# Patient Record
Sex: FEMALE | Race: WHITE
[De-identification: names, ages, dates, MRNs, and addresses within clinical notes are randomized per-mention and may not be internally consistent; named-entity substitution may affect disease eponyms.]

---

## 2017-12-30 ENCOUNTER — HOSPITAL ENCOUNTER (INPATIENT)
Dept: HOSPITAL 92 - ERS | Age: 82
LOS: 7 days | Discharge: NURSING, REHAB OR LONG-TERM CARE | DRG: 469 | End: 2018-01-06
Attending: SURGERY | Admitting: SURGERY
Payer: MEDICARE

## 2017-12-30 ENCOUNTER — HOSPITAL ENCOUNTER (EMERGENCY)
Dept: HOSPITAL 9 - MADERS | Age: 82
Discharge: ANOTHER HOSPITAL | End: 2017-12-30
Payer: MEDICARE

## 2017-12-30 VITALS — BODY MASS INDEX: 27.9 KG/M2

## 2017-12-30 DIAGNOSIS — S72.012A: Primary | ICD-10-CM

## 2017-12-30 DIAGNOSIS — J06.9: ICD-10-CM

## 2017-12-30 DIAGNOSIS — F03.90: ICD-10-CM
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
## 2017-12-30 DIAGNOSIS — J98.11: ICD-10-CM

## 2017-12-30 DIAGNOSIS — G47.31: ICD-10-CM
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
## 2017-12-30 DIAGNOSIS — D62: ICD-10-CM

## 2017-12-30 DIAGNOSIS — Z79.84: ICD-10-CM

## 2017-12-30 DIAGNOSIS — Z90.11: ICD-10-CM

## 2017-12-30 DIAGNOSIS — R41.0: ICD-10-CM

## 2017-12-30 DIAGNOSIS — Z79.899: ICD-10-CM

## 2017-12-30 DIAGNOSIS — E87.6: ICD-10-CM

## 2017-12-30 DIAGNOSIS — E83.39: ICD-10-CM

## 2017-12-30 DIAGNOSIS — E83.42: ICD-10-CM

## 2017-12-30 DIAGNOSIS — E11.9: ICD-10-CM

## 2017-12-30 DIAGNOSIS — J96.01: ICD-10-CM
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
## 2017-12-30 DIAGNOSIS — B97.4: ICD-10-CM

## 2017-12-30 DIAGNOSIS — I10: ICD-10-CM

## 2017-12-30 DIAGNOSIS — S72.002A: Primary | ICD-10-CM
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
## 2017-12-30 LAB
ALBUMIN SERPL BCG-MCNC: 3.6 G/DL (ref 3.4–4.8)
ALP SERPL-CCNC: 122 U/L (ref 40–150)
ALT SERPL W P-5'-P-CCNC: 14 U/L (ref 8–55)
ANION GAP SERPL CALC-SCNC: 16 MMOL/L (ref 10–20)
APTT PPP: 30.5 SEC (ref 22.9–36.1)
AST SERPL-CCNC: 16 U/L (ref 5–34)
BASOPHILS # BLD AUTO: 0 THOU/UL (ref 0–0.2)
BASOPHILS NFR BLD AUTO: 0.3 % (ref 0–1)
BILIRUB SERPL-MCNC: 0.4 MG/DL (ref 0.2–1.2)
BUN SERPL-MCNC: 23 MG/DL (ref 9.8–20.1)
CALCIUM SERPL-MCNC: 9.5 MG/DL (ref 7.8–10.44)
CHLORIDE SERPL-SCNC: 109 MMOL/L (ref 98–107)
CK MB SERPL-MCNC: 1.2 NG/ML (ref 0–6.6)
CK SERPL-CCNC: 45 U/L (ref 29–168)
CO2 SERPL-SCNC: 20 MMOL/L (ref 23–31)
CREAT CL PREDICTED SERPL C-G-VRATE: 0 ML/MIN (ref 70–130)
EOSINOPHIL # BLD AUTO: 0 THOU/UL (ref 0–0.7)
EOSINOPHIL NFR BLD AUTO: 0.6 % (ref 0–10)
GLOBULIN SER CALC-MCNC: 2.9 G/DL (ref 2.4–3.5)
GLUCOSE SERPL-MCNC: 147 MG/DL (ref 83–110)
HGB BLD-MCNC: 11.7 G/DL (ref 12–16)
INR PPP: 1
LYMPHOCYTES # BLD AUTO: 0.7 THOU/UL (ref 1.2–3.4)
LYMPHOCYTES NFR BLD AUTO: 10.9 % (ref 21–51)
MCH RBC QN AUTO: 29.5 PG (ref 27–31)
MCV RBC AUTO: 86.3 FL (ref 81–99)
MONOCYTES # BLD AUTO: 0.3 THOU/UL (ref 0.11–0.59)
MONOCYTES NFR BLD AUTO: 5 % (ref 0–10)
NEUTROPHILS # BLD AUTO: 5.1 THOU/UL (ref 1.4–6.5)
NEUTROPHILS NFR BLD AUTO: 83.2 % (ref 42–75)
PLATELET # BLD AUTO: 136 THOU/UL (ref 130–400)
POTASSIUM SERPL-SCNC: 3.8 MMOL/L (ref 3.5–5.1)
PROTHROMBIN TIME: 13.3 SEC (ref 12–14.7)
RBC # BLD AUTO: 3.97 MILL/UL (ref 4.2–5.4)
SODIUM SERPL-SCNC: 141 MMOL/L (ref 136–145)
TROPONIN I SERPL DL<=0.01 NG/ML-MCNC: (no result) NG/ML (ref ?–0.03)
WBC # BLD AUTO: 6.1 THOU/UL (ref 4.8–10.8)

## 2017-12-30 PROCEDURE — 85730 THROMBOPLASTIN TIME PARTIAL: CPT

## 2017-12-30 PROCEDURE — 84100 ASSAY OF PHOSPHORUS: CPT

## 2017-12-30 PROCEDURE — 87633 RESP VIRUS 12-25 TARGETS: CPT

## 2017-12-30 PROCEDURE — 85610 PROTHROMBIN TIME: CPT

## 2017-12-30 PROCEDURE — 96374 THER/PROPH/DIAG INJ IV PUSH: CPT

## 2017-12-30 PROCEDURE — 94760 N-INVAS EAR/PLS OXIMETRY 1: CPT

## 2017-12-30 PROCEDURE — 85025 COMPLETE CBC W/AUTO DIFF WBC: CPT

## 2017-12-30 PROCEDURE — 85027 COMPLETE CBC AUTOMATED: CPT

## 2017-12-30 PROCEDURE — 84484 ASSAY OF TROPONIN QUANT: CPT

## 2017-12-30 PROCEDURE — 0SRS01A REPLACEMENT OF LEFT HIP JOINT, FEMORAL SURFACE WITH METAL SYNTHETIC SUBSTITUTE, UNCEMENTED, OPEN APPROACH: ICD-10-PCS | Performed by: ORTHOPAEDIC SURGERY

## 2017-12-30 PROCEDURE — 93010 ELECTROCARDIOGRAM REPORT: CPT

## 2017-12-30 PROCEDURE — 71045 X-RAY EXAM CHEST 1 VIEW: CPT

## 2017-12-30 PROCEDURE — 83735 ASSAY OF MAGNESIUM: CPT

## 2017-12-30 PROCEDURE — 71010: CPT

## 2017-12-30 PROCEDURE — 80048 BASIC METABOLIC PNL TOTAL CA: CPT

## 2017-12-30 PROCEDURE — 36416 COLLJ CAPILLARY BLOOD SPEC: CPT

## 2017-12-30 PROCEDURE — 93005 ELECTROCARDIOGRAM TRACING: CPT

## 2017-12-30 PROCEDURE — 83880 ASSAY OF NATRIURETIC PEPTIDE: CPT

## 2017-12-30 PROCEDURE — 82553 CREATINE MB FRACTION: CPT

## 2017-12-30 PROCEDURE — 94640 AIRWAY INHALATION TREATMENT: CPT

## 2017-12-30 PROCEDURE — S0028 INJECTION, FAMOTIDINE, 20 MG: HCPCS

## 2017-12-30 PROCEDURE — 80053 COMPREHEN METABOLIC PANEL: CPT

## 2017-12-30 PROCEDURE — 72170 X-RAY EXAM OF PELVIS: CPT

## 2017-12-30 PROCEDURE — 36415 COLL VENOUS BLD VENIPUNCTURE: CPT

## 2017-12-30 PROCEDURE — G0390 TRAUMA RESPONS W/HOSP CRITI: HCPCS

## 2017-12-30 PROCEDURE — 82550 ASSAY OF CK (CPK): CPT

## 2017-12-30 RX ADMIN — Medication SCH GM: at 21:18

## 2017-12-30 RX ADMIN — DOCUSATE SODIUM 50 MG AND SENNOSIDES 8.6 MG SCH TAB: 8.6; 5 TABLET, FILM COATED ORAL at 21:12

## 2017-12-30 NOTE — RAD
PORTABLE CHEST ONE VIEW:

12/30/2017 at 8:31 a.m.

 

HISTORY:

Pre-op evaluation.

 

FINDINGS/IMPRESSION:

The heart size is enlarged.  Bibasilar infiltrates versus atelectasis are seen.  No pneumothoraces ar
e seen.  Small effusions cannot be excluded.  There is no evidence of rufino pleural edema.

 

 

POS: [facility]

## 2017-12-30 NOTE — RAD
TWO VIEWS OF THE LEFT HIP:

 

INDICATION:

Post-op left hip.

 

COMPARISON: 

Two views of the left hip dated 12/30/2017.

 

FINDINGS:   

Since the comparison examination, there has been interval placement of a left hip endoprosthesis.  Th
e prosthetic component projects in the expected position.

 

IMPRESSION:   

Postoperative left hip.

 

POS: [facility]

## 2017-12-30 NOTE — CON
DATE OF SERVICE:  12/30/2017

 

TRAUMA CONSULT

 

Please see Lizbet Williamson NP complete history of present illness.

 

BRIEF HISTORY:  This is an 82-year-old female who sustained a left hip fracture and a fall and transf
erred to [facility] for higher level of care.  Hemodynamically stable.  No other complaints.  She is 
confused, which makes the history difficulty.  She is unsure of her home medications or chronic medic
al history.

 

PHYSICAL EXAMINATION:

GENERAL:  She is awake and alert, although confused.

CHEST:  Bilateral clear.

HEART:  Regular rate and rhythm.

BREAST:  She has obvious right mastectomy in the past.

ABDOMEN:  Soft, tenderness in the left hip.

EXTREMITIES:  Left lower extremity shortened, externally rotated, but palpable radial and palpable do
rsalis pedis pulses.  No obvious limb threatening ischemia.

 

ASSESSMENT:  Left hip fracture.

 

PLAN:  Plan per ortho, trauma to admit.  See Lizbet Williamson NP note.

## 2017-12-30 NOTE — CON
DATE OF CONSULTATION:  12/30/2017

 

REASON FOR CONSULTATION:  Left hip pain.

 

HISTORY OF PRESENT ILLNESS:  This is a very pleasant woman who is 82 years of age, she got out of bed
 today and fell onto her left hip.

 

PAST MEDICAL HISTORY:  Positive for hypertension, high cholesterol, dementia.

 

SOCIAL HISTORY:  Does not smoke.  She does not drink.  She has excellent social support.  She is a ho
usehold only ambulator with the assistance of a walker.

 

MEDICATIONS:  Listed in the chart.

 

PHYSICAL EXAMINATION:

GENERAL:  Shows a pleasant woman who is in no distress.  She is actually able to answer some question
s, but seems to be fairly confused.

HEENT:  Normocephalic, atraumatic.

LUNGS:  Clear.

HEART:  Regular rate and rhythm.

ABDOMEN:  Soft, nontender.

EXTREMITIES:  Left hip shows shortening external rotation, tender to manipulation, she has intact DP 
and PT pulses.  No skin lesions.

 

IMAGING DATA:  Radiographs show a displaced femoral neck fracture.

 

PLAN:  Hemiarthroplasty of hip.  She and her family understand risk of infection, stiffness, nerve or
 blood vessel damage, blood clots, transfusion, death, dislocation and would like to proceed with ted ontiveros.

## 2017-12-30 NOTE — RAD
LEFT HIP TWO VIEWS:

 

HISTORY:

Left hip pain.

 

FINDINGS:

There is a fracture involving the subcapital region of the neck of the left femur with associated for
eshortening.

 

POS: [facility]

## 2017-12-30 NOTE — HP
DATE OF ADMISSION:  12/30/2017

 

ADMITTING PHYSICIAN:  Dr. Roger Purvis.

 

CONSULTING PHYSICIAN:  Dr. Vik Goodman, Orthopedic.

 

HISTORY OF PRESENT ILLNESS:  An 82-year-old female, who was in her usual state 
of health this morning when she apparently had a fall getting out of bed.  She 
lives at home with her daughter and daughter reports that she heard her mother 
fall and then went to the bedroom and found her on the floor.  She was 
complaining of left hip pain.  She was transported by ambulance to [facility], where left hip fracture was identified.  She was then transferred 
to [place] for higher level of care.  She is now seen in the ER, by Trauma 
Services.  She complains of pain to the left hip.  She has been hemodynamically 
stable.  Neurologically, she has remained at baseline.  Pain is worse with 
movement of left leg.  The patient's pain is relieved by nothing.  She is 
unable to quantify the pain or describe the quality of the pain.

 

REVIEW OF SYSTEMS:  The patient is unable to participate with review of systems 
due to baseline dementia.

 

PAST MEDICAL HISTORY:  Diabetes type 2, hypertension, dementia.

 

PAST SURGICAL HISTORY:  Right mastectomy, hysterectomy, cholecystectomy, 
unknown years.

 

SOCIAL HISTORY:  Family denies intake of alcohol, drugs or tobacco.  Patient 
lives at home with her daughter.

 

FAMILY HISTORY:  Unknown.

 

ALLERGIES:  No known drug allergies.

 

CURRENT MEDICATIONS:

1.  Losartan 25 mg once daily.

2.  Metformin 500 mg once daily.

3.  Citalopram 20 mg once daily.

4.  Hydrochlorothiazide 12.5 mg once daily.

5.  Temazepam 15 mg once daily.

 

PHYSICAL EXAMINATION:

VITAL SIGNS:  Blood pressure 152/72, pulse 73, respirations 22, temperature 98.3
, and O2 sat 98%.

CONSTITUTIONAL:  Elderly female in no acute distress.  Lying on bed.  Able to 
answer questions, but remains confused.

HEENT:  Atraumatic, normocephalic.

RESPIRATORY:  Respirations even and unlabored.  Expiratory wheezes heard in 
bilateral upper lobes.

CARDIOVASCULAR:  Regular rate and rhythm.  Heart sounds normal.

ABDOMEN:  Soft, nontender, nondistended.  Bowel sounds present.

EXTREMITIES:  With the exception of the left lower extremities are free from 
visible trauma.  Cap refill is brisk.  Neurovascularly intact.  Left lower 
extremity pain with movement or palpation to left lateral hip area, rotated 
laterally.  Pulses 2+.

NEUROLOGIC:  GCS 14.  [id number].

 

ASSESSMENT:

1.  An 82-year-old female status post ground level fall.

2.  Left displaced femoral neck fracture.

 

PLAN:

1.  Admit to hospital by Trauma Services.

2.  Consult to Orthopedics, Dr. Goodman.  Dr. Goodman currently at bedside.

3.  OR today with Dr. Goodman.

4.  N.p.o. until after operative procedure.

5.  Hospital medicine consult for general medical management.

 

Assessment and plan of care were discussed with the patient, her daughter, and 
her family at bedside.  All are in agreement.  



The patient was seen and examined with attending trauma surgeon, Dr. Tj Purvis, who agrees with the assessment and plan of care.

 

BARRY

## 2017-12-30 NOTE — RAD
AP PELVIS:

 

HISTORY:

Left-sided hip pain.

 

FINDINGS:

There is a subcapital fracture of the left femoral neck with associated foreshortening.

 

POS: [facility]

## 2017-12-31 LAB
ANION GAP SERPL CALC-SCNC: 16 MMOL/L (ref 10–20)
BASOPHILS # BLD AUTO: 0 THOU/UL (ref 0–0.2)
BASOPHILS NFR BLD AUTO: 0.3 % (ref 0–1)
BUN SERPL-MCNC: 28 MG/DL (ref 9.8–20.1)
CALCIUM SERPL-MCNC: 9.3 MG/DL (ref 7.8–10.44)
CHLORIDE SERPL-SCNC: 109 MMOL/L (ref 98–107)
CO2 SERPL-SCNC: 18 MMOL/L (ref 23–31)
CREAT CL PREDICTED SERPL C-G-VRATE: 24 ML/MIN (ref 70–130)
EOSINOPHIL # BLD AUTO: 0 THOU/UL (ref 0–0.7)
EOSINOPHIL NFR BLD AUTO: 0.1 % (ref 0–10)
GLUCOSE SERPL-MCNC: 116 MG/DL (ref 83–110)
HGB BLD-MCNC: 10 G/DL (ref 12–16)
HGB BLD-MCNC: 10 G/DL (ref 12–16)
LYMPHOCYTES # BLD: 0.5 THOU/UL (ref 1.2–3.4)
LYMPHOCYTES NFR BLD AUTO: 7.2 % (ref 21–51)
MCH RBC QN AUTO: 28.1 PG (ref 27–31)
MCH RBC QN AUTO: 28.6 PG (ref 27–31)
MCV RBC AUTO: 86.9 FL (ref 81–99)
MCV RBC AUTO: 86.9 FL (ref 81–99)
MONOCYTES # BLD AUTO: 0.4 THOU/UL (ref 0.11–0.59)
MONOCYTES NFR BLD AUTO: 4.9 % (ref 0–10)
NEUTROPHILS # BLD AUTO: 6.4 THOU/UL (ref 1.4–6.5)
NEUTROPHILS NFR BLD AUTO: 87.5 % (ref 42–75)
PLATELET # BLD AUTO: 133 THOU/UL (ref 130–400)
PLATELET # BLD AUTO: 140 THOU/UL (ref 130–400)
POTASSIUM SERPL-SCNC: 3.5 MMOL/L (ref 3.5–5.1)
RBC # BLD AUTO: 3.49 MILL/UL (ref 4.2–5.4)
RBC # BLD AUTO: 3.54 MILL/UL (ref 4.2–5.4)
SODIUM SERPL-SCNC: 139 MMOL/L (ref 136–145)
WBC # BLD AUTO: 7.3 THOU/UL (ref 4.8–10.8)
WBC # BLD AUTO: 7.3 THOU/UL (ref 4.8–10.8)

## 2017-12-31 RX ADMIN — DOCUSATE SODIUM 50 MG AND SENNOSIDES 8.6 MG SCH TAB: 8.6; 5 TABLET, FILM COATED ORAL at 20:12

## 2017-12-31 RX ADMIN — MULTIPLE VITAMINS W/ MINERALS TAB SCH TAB: TAB at 08:43

## 2017-12-31 RX ADMIN — Medication SCH GM: at 03:24

## 2017-12-31 RX ADMIN — DOCUSATE SODIUM 50 MG AND SENNOSIDES 8.6 MG SCH TAB: 8.6; 5 TABLET, FILM COATED ORAL at 08:43

## 2017-12-31 RX ADMIN — FAMOTIDINE SCH MG: 10 INJECTION, SOLUTION INTRAVENOUS at 20:11

## 2017-12-31 NOTE — PRG
DATE OF SERVICE:  12/31/2017

 

SUBJECTIVE:  Ms. Farnsworth is an 82-year-old woman who is postoperative day #1, status post left hip art
hroplasty following a fall and subsequent fractured left hip.  The patient is awake and alert.  She r
eports adequate pain control.  She had a transient episode of nausea immediately postoperatively.  Sh
[place] is currently tolerating a general diet and having normal bowel and urinary function.

 

PHYSICAL EXAMINATION:

VITAL SIGNS:  Includes blood pressure 148/72, pulse 78, respiratory rate is 18, temperature is 98.6 d
egrees Fahrenheit, oxygen saturation is 94% on room air.

HEENT:  Reveals normocephalic and atraumatic.  Pupils equal, round, reactive to light and accommodati
on.  Extraocular muscles are intact bilaterally.  No sclerae icterus is present.  Oral mucosa is pink
 and moist.  No lesions are noted.

NECK:  Supple.  No palpable lymphadenopathy or thyromegaly present.

HEART:  Reveals regular rate and rhythm, no murmurs or gallops auscultated.

LUNGS:  Clear to auscultation bilaterally.  Breathing is regular and unlabored.

ABDOMEN:  Soft, nontender, nondistended.

EXTREMITIES:  Reveals 2+ radial and pedal pulses bilaterally.  No ankle edema is present.

NEUROLOGIC:  Reveals no focal deficits present.

 

PERTINENT LABORATORY FINDINGS:  Today includes CBC with 7300 white blood cells, hemoglobin and hemato
crit are stable at 10.0 and 30.8 respectively.  Platelet count is stable at 140,000.  Metabolic profi
le:  Sodium 139, potassium is 3.5, chloride is 109, bicarbonate 18, BUN 28, creatinine is 1.83, gluco
se 116.

 

IMPRESSION:

1.  Postoperative day #1, status post left hip arthroplasty.

2.  Acute on chronic renal insufficiency.

3.  Initiate physical and occupational therapy.

4.  Monitor BUN and creatinine, as well as urinary output as end point of resuscitation.  The patient
 will be seen by PM&R in anticipation for transfer to inpatient rehabilitation.

## 2018-01-01 LAB
ANION GAP SERPL CALC-SCNC: 8 MMOL/L (ref 10–20)
BASOPHILS # BLD AUTO: 0 THOU/UL (ref 0–0.2)
BASOPHILS NFR BLD AUTO: 0.2 % (ref 0–1)
BUN SERPL-MCNC: 25 MG/DL (ref 9.8–20.1)
CALCIUM SERPL-MCNC: 8.9 MG/DL (ref 7.8–10.44)
CHLORIDE SERPL-SCNC: 109 MMOL/L (ref 98–107)
CO2 SERPL-SCNC: 22 MMOL/L (ref 23–31)
CREAT CL PREDICTED SERPL C-G-VRATE: 28 ML/MIN (ref 70–130)
EOSINOPHIL # BLD AUTO: 0.1 THOU/UL (ref 0–0.7)
EOSINOPHIL NFR BLD AUTO: 1.6 % (ref 0–10)
GLUCOSE SERPL-MCNC: 179 MG/DL (ref 83–110)
HGB BLD-MCNC: 8.4 G/DL (ref 12–16)
HGB BLD-MCNC: 8.7 G/DL (ref 12–16)
LYMPHOCYTES # BLD: 0.5 THOU/UL (ref 1.2–3.4)
LYMPHOCYTES NFR BLD AUTO: 9.8 % (ref 21–51)
MCH RBC QN AUTO: 28.7 PG (ref 27–31)
MCH RBC QN AUTO: 28.8 PG (ref 27–31)
MCV RBC AUTO: 87.8 FL (ref 81–99)
MCV RBC AUTO: 88 FL (ref 81–99)
MONOCYTES # BLD AUTO: 0.3 THOU/UL (ref 0.11–0.59)
MONOCYTES NFR BLD AUTO: 5.2 % (ref 0–10)
NEUTROPHILS # BLD AUTO: 4.1 THOU/UL (ref 1.4–6.5)
NEUTROPHILS NFR BLD AUTO: 83.2 % (ref 42–75)
PLATELET # BLD AUTO: 118 THOU/UL (ref 130–400)
PLATELET # BLD AUTO: 120 THOU/UL (ref 130–400)
POTASSIUM SERPL-SCNC: 3.4 MMOL/L (ref 3.5–5.1)
RBC # BLD AUTO: 2.92 MILL/UL (ref 4.2–5.4)
RBC # BLD AUTO: 3.03 MILL/UL (ref 4.2–5.4)
SODIUM SERPL-SCNC: 136 MMOL/L (ref 136–145)
WBC # BLD AUTO: 4.8 THOU/UL (ref 4.8–10.8)
WBC # BLD AUTO: 4.9 THOU/UL (ref 4.8–10.8)

## 2018-01-01 RX ADMIN — MULTIPLE VITAMINS W/ MINERALS TAB SCH TAB: TAB at 08:56

## 2018-01-01 RX ADMIN — DOCUSATE SODIUM 50 MG AND SENNOSIDES 8.6 MG SCH TAB: 8.6; 5 TABLET, FILM COATED ORAL at 08:56

## 2018-01-01 RX ADMIN — DOCUSATE SODIUM 50 MG AND SENNOSIDES 8.6 MG SCH TAB: 8.6; 5 TABLET, FILM COATED ORAL at 20:14

## 2018-01-01 RX ADMIN — FAMOTIDINE SCH MG: 10 INJECTION, SOLUTION INTRAVENOUS at 20:12

## 2018-01-01 NOTE — RAD
RADIOGRAPH CHEST 1 VIEW:

 

Date: 1/1/18.

Time: 9:17 a.m.

 

HISTORY: 

An 82-year-old female with cough and dyspnea.

 

COMPARISON: 

12/30/17, 8:31 a.m.

 

FINDINGS:

Again, the right lung is hypoinflated.  There is better aeration of the left lung base now compared t
o previously, and currently all of the visualized left lung fields are clear.  There is a new finding
 of a large amount of gas distending the hepatic flexure of the colon.  There is colonic interpositio
n between the diaphragm and the right lobe of the liver.  There is subsegmental atelectasis in the ad
jacent right lung base.  No pulmonary edema.  No pneumothorax.  

 

IMPRESSION:

1.  Gaseous distention of the colon.

2.  Colonic interposition 

3.  Mildly elevated right hemidiaphragm.

4.  Subsegmental atelectasis at the right lung base.

5.  No consolidation or pulmonary edema identified.

 

 

LOUIS []

 

POS: [facility]

## 2018-01-01 NOTE — OP
DATE OF PROCEDURE:  12/30/2017

 

PREOPERATIVE DIAGNOSIS:  Left hip displaced femoral neck fracture.

 

POSTOPERATIVE DIAGNOSIS:  Left hip displaced femoral neck fracture.

 

OPERATIVE PROCEDURE:  Press-fit left hip monopolar hemiarthroplasty.

 

SURGEON:  Vik Goodman M.D.

 

ASSISTANT:  Tee Koo PA-C.

 

ANESTHESIA:  General via endotracheal tube augmented with indwelling epidural.

 

COMPONENTS USED:  [employer] size 1 press fit Accolade hip stem with a 45 mm monopolar metal
lic femoral head.

 

ESTIMATED BLOOD LOSS:  Less than 100 mL.

 

FINDINGS:  Femoral neck fracture as described on radiograph and hemarthrosis and capsule.

 

DRAINS:  None.

 

SPECIMENS:  None.

 

COMPLICATIONS:  None.

 

COUNTS:  Correct.

 

INDICATIONS FOR SURGERY:  Ammy is an 82-year-old female who fell the day prior to admission resulting
 in a left femoral neck fracture.  She was admitted by the Trauma Team and our service was consulted 
for operative management of her hip fracture.

 

PROCEDURE IN DETAIL:  After informed consent was obtained in the preoperative holding area, the patie
nt received preoperative antibiotics, was taken to the operative suite, and positioned appropriately 
on the operating table in the lateral decubitus position.  The hip was then prepped and draped in usu
al sterile fashion.  Prior to incision, a time out was called and all members of the surgical team ag
rasta upon site, surgeon, and patient.  Once this was completed, an incision was made using a lateral 
approach two fingerbreadths above the tip of the trochanter and two fingerbreadths below the flare by
 palpation.  The subcutaneous layer was opened with Bovie electrocautery and the IT band was encounte
red.  This was identified and a key elevator was used to remove the adipose tissue from it.  Once it 
was cleaned, Bovie and scissors were used to incise the IT band, exposing the lateral aspect of the t
rochanter and the abductor muscles.  The abductor muscles were then reflected using Bovie electrocaut
lee.  The gluteus medius was also then reflected anteriorly and Charnley retractor was placed to hold
 this open.  An anterior capsulotomy was performed identifying the fracture hematoma at that time.  O
nce the capsulotomy was completed, the neck cut was then made using the oscillating saw.  Once the na
pkin ring of bone was removed using ronamerica Patelell, the femoral head was then removed using a corksc
rew combination with Hohmann retractors.  The appropriate size was then chosen and this was trialed a
nd attention was then turned to femoral preparation.  The cookie cutter was used followed by intramed
ullary reamers and sequential broaching up to the appropriate size of press fit stem.  Once this was 
completed, we had good calcar fit and one fingerbreadth above the top of the lesser trochanter.  We t
hen malleted in the appropriate sized femoral prosthesis using a good strong press fit.  Happy with o
ur fit, we went ahead and trialed up to the appropriate neck length using shuck, internal and externa
l rotation, and other maneuvers to identify good solid hip fit and finish, and without any dislocatio
n at greater than 50-60 degrees of internal rotation with the hip flexed to 90 degrees.  Angelesuck was ne
gative as well.  We then selected the appropriate neck length and endoprosthesis malleted firmly into
 the Bishop taper.  A reduction maneuver was then performed and we copiously irrigated the entire woun
d with normal saline.  Again, we ran the hip through hip internal and external rotation flexed at 90 
degrees and shuck being negative.  After copious irrigation of three liters of normal saline, the abd
uctors were then reapproximated and stitched down using #2 Vicryl.  The IT band was approximated with
 #2 Vicryl and closed with a running #2 Quill polypropylene stitch and subcutaneous layer was closed 
with a running 0 Quill stitch and skin closure was accomplished with 3-0 Monocryl Quill stitch and De
rmabond skin cement.  All counts were correct.  A sterile dressing was applied and the procedure was 
terminated without any complications.  The airway was removed in the operative suite and the patient 
was taken to recovery room in stable condition.

## 2018-01-01 NOTE — PRG
DATE OF SERVICE:  01/01/2018

 

ATTENDING PHYSICIAN:  Stan Villanueva M.D.

 

This is Lizbet Williamson NP dictating a daily progress note for Dr. Stan Villanueva.

 

SUBJECTIVE:  An 82-year-old female, postoperative day #2, status post left hip 
hemiarthroplasty.  Patient is seen this morning on rounds with Dr. Villanueva.  
Initially, patient managed on surgical floor.  She had been progressing 
favorably.  This morning, she has developed some shortness of breath with 
increasing oxygen requirements.  She also has cough which her daughter states 
that she had prior to coming to the hospital.  As patient was having these 
symptoms, EKG and chest x-ray was done and Dr. Kolb of Pulmonary Medicine was 
consulted.

 

OBJECTIVE:

VITAL SIGNS:  This morning temperature 98.1, pulse 72, respirations 20, O2 sat 
93% on 3 liters, blood pressure 144/69.

CONSTITUTIONAL:  Elderly female experiencing increased work of breathing.

HEENT:  Normocephalic, atraumatic.

PULMONARY:  O2 sat 93% on 3 liters O2.  Diminished bilaterally.

CARDIOVASCULAR:  Regular rate and rhythm.

ABDOMEN:  Soft, nontender, and nondistended.

EXTREMITIES:  Moves all extremities.  2+ pulses peripherally.  Cap refill brisk.

NEUROLOGIC:  Patient with periods of confusion.  GCS 14, E4 V4 M6.

 

ASSESSMENT:

1.  Postoperative day #2, status post left hip arthroplasty.

2.  Increased work of breathing, requiring increase in oxygen.

3.  Elevated creatinine, improving.

 

PLAN:

1.  Transfer to [facility].

2.  Appreciate Pulmonary Medicine, Dr. Looney consult. 

3. Begin DVT prophylaxis if okay with Pulmonary Medicine.

4. PT/OT as tolerated.

 

The patient was seen and examined with Dr. Stan Villanueva who agrees with the 
assessment and plan.

 

[facility]

## 2018-01-01 NOTE — CON
DATE OF CONSULTATION:  01/01/2018

 

SERVICE:  Pulmonary Medicine.

 

REASON FOR CONSULTATION:  Respiratory failure.

 

HISTORY OF PRESENT ILLNESS:  The patient is an 82-year-old white female with past medical history sig
nificant for dementia.  She was in her usual state of health when she sustained a fall.  She ended up
 breaking her femur.  She is postop day #2 from a fixation procedure.  She was recovering very nicely
, but this morning, she was increasingly altered.  She was sedated.  She had increasing periods of sl
eeping.  The patient uses oxygen at night while sleeping because of some likely central apneas.  It w
as on her when she fell asleep.  She ended up having intermittent episodes of hypoxemia.  She was sub
sequently placed on 2 liters nasal cannula.  Because of this and her increasing confusion, I was cons
ulted.  She is somnolent during my evaluation, but when she wakes up with start and answers a couple 
of questions and then drifts off back to sleep.  She is quite easy to arouse.  She does not look to b
e in any significant distress at this time.  She cannot provide any additional elements of the histor
y.

 

PAST MEDICAL HISTORY:

1.  Type 2 diabetes mellitus.

2.  Hypertension.

3.  Dementia.

 

PAST SURGICAL HISTORY:

1.  Right mastectomy.

2.  Hysterectomy.

3.  Cholecystectomy.

 

SOCIAL HISTORY:  Negative for alcohol, tobacco or illicit drug use.  She is currently living at home.
  At this point, she can drive, due to finances because of her cognitive impairment.  She is able to 
walk under her own strength at home, but has been spending less than last time on her feet over the l
ast couple of months.  She has no exposure to chemicals, dust, asbestos or tuberculosis.

 

FAMILY HISTORY:  Noncontributory.

 

ALLERGIES:  No known drug allergies.

 

MEDICATIONS:  List of her outpatient and inpatient medications were reviewed in the medical record.  
One small specific update was made at this time.

 

REVIEW OF SYSTEMS:  Currently, the patient is somnolent and this is difficult to obtain.

 

PHYSICAL EXAMINATION:

VITAL SIGNS:  Afebrile, pulse 62, blood pressure 164/55, respirations 20, saturation 100% on 3 liters
 nasal cannula while awake.  When she drifts off to sleep, she drops down into the lower 90s and uppe
r 80s.

HEENT:  Normocephalic, atraumatic.  Sclerae are white, conjunctivae pink.  Oral and nasal mucosa is m
oist without lesions.

LUNGS:  Decent air entry.  There is no prolonged expiratory phase or wheezing identified.  Rhonchi ar
e present bilaterally.  Crackles are also evident in the bibasilar region and worse on the right.

HEART:  Normal rate, regular.

ABDOMEN:  Soft, nontender, nondistended.  Bowel sounds are positive.

MUSCULOSKELETAL:  No cyanosis or clubbing.  There is trace pitting in the leg that was cut.  Otherwis
e, there is no significant edema throughout.

GENITOURINARY:  Nuñez catheter is in place.

NEUROLOGIC:  Grossly nonfocal.

 

LABORATORY DATA:  WBC 4.9, hemoglobin 8.7 and stable, platelets 118,000 and gently down trending.  IN
R 1.0.  Creatinine 1.56 and down trending, BUN 25.  Potassium 3.4.

 

IMAGING:  Chest x-ray demonstrates atelectasis of the right lower lung base.  There is decreased lung
 volumes on the right.  No consolidation or pulmonary edema is identified.  The colon is distended.

 

ASSESSMENT:

1.  Acute hypoxic respiratory failure secondary to atelectasis of the right lower lobe.

2.  Colon distention.

3.  Central sleep apnea.

4.  Delirium, suspected.

 

PLAN:  I do not suspect that we are dealing with a pulmonary embolism or with a fat embolism at this 
time.  That being said, I would like to move her down to the IMCU for closer observation.  If she rem
ains stable for period of 24-48 hours, we can move her right back upstairs to the surgical unit.  We 
will initiate Lovenox for DVT prophylaxis.  I will give her one small dose of Lasix, though I think t
hat the crackles are appreciated, most likely secondary to the atelectasis and not true volume overlo
ad.  That being said, I feel that she could probably tolerate dose.  We will work on mobilizing her a
nd removed the Nuñez catheter.  Potassium will be replaced.

## 2018-01-02 LAB
ANION GAP SERPL CALC-SCNC: 11 MMOL/L (ref 10–20)
BUN SERPL-MCNC: 22 MG/DL (ref 9.8–20.1)
CALCIUM SERPL-MCNC: 9.3 MG/DL (ref 7.8–10.44)
CHLORIDE SERPL-SCNC: 105 MMOL/L (ref 98–107)
CO2 SERPL-SCNC: 26 MMOL/L (ref 23–31)
CREAT CL PREDICTED SERPL C-G-VRATE: 31 ML/MIN (ref 70–130)
GLUCOSE SERPL-MCNC: 107 MG/DL (ref 83–110)
HGB BLD-MCNC: 8.9 G/DL (ref 12–16)
MAGNESIUM SERPL-MCNC: 1.9 MG/DL (ref 1.6–2.6)
MCH RBC QN AUTO: 28.8 PG (ref 27–31)
MCV RBC AUTO: 87.7 FL (ref 81–99)
PLATELET # BLD AUTO: 132 THOU/UL (ref 130–400)
POTASSIUM SERPL-SCNC: 3.5 MMOL/L (ref 3.5–5.1)
RBC # BLD AUTO: 3.1 MILL/UL (ref 4.2–5.4)
SODIUM SERPL-SCNC: 138 MMOL/L (ref 136–145)
WBC # BLD AUTO: 4.9 THOU/UL (ref 4.8–10.8)

## 2018-01-02 RX ADMIN — MULTIPLE VITAMINS W/ MINERALS TAB SCH TAB: TAB at 07:33

## 2018-01-02 RX ADMIN — DOCUSATE SODIUM 50 MG AND SENNOSIDES 8.6 MG SCH: 8.6; 5 TABLET, FILM COATED ORAL at 09:50

## 2018-01-02 RX ADMIN — DOCUSATE SODIUM 50 MG AND SENNOSIDES 8.6 MG SCH TAB: 8.6; 5 TABLET, FILM COATED ORAL at 07:33

## 2018-01-02 RX ADMIN — Medication SCH MG: at 20:56

## 2018-01-02 RX ADMIN — DOCUSATE SODIUM 50 MG AND SENNOSIDES 8.6 MG SCH TAB: 8.6; 5 TABLET, FILM COATED ORAL at 20:57

## 2018-01-02 RX ADMIN — Medication SCH: at 20:56

## 2018-01-02 RX ADMIN — MULTIPLE VITAMINS W/ MINERALS TAB SCH: TAB at 09:50

## 2018-01-02 NOTE — PRG
DATE OF SERVICE:  01/02/2018

 

SERVICE:  Pulmonary Medicine.

 

INTERVAL HISTORY:  The patient is doing great from a respiratory standpoint.  Her mentation is much i
mproved today.  She could not recognize her daughter, or her grandchildren yesterday.  Today, she is 
having a better day from a mentation standpoint.  Her breathing is improved.  She seems to tolerate L
asix yesterday fairly well.

 

PHYSICAL EXAMINATION:

VITAL SIGNS:  Afebrile with a T-max of 99.9, pulse 77, blood pressure 147/72, respirations 20, satura
tion 98% back on room air.

GENERAL:  Patient is awake, alert, in no apparent distress.

LUNGS:  Decent air entry.  There is no prolonged expiratory phase or wheezing present.

HEART:  Normal rate, regular.

ABDOMEN:  Soft, nontender, nondistended.  Bowel sounds are positive.

MUSCULOSKELETAL:  No cyanosis or clubbing.  No pitting in the bilateral lower extremities.

NEUROLOGIC:  Nonfocal.

 

LABORATORY DATA:  WBC 4.9, hemoglobin 8.9, platelets 132,000.  Creatinine 1.45 and gently down trendi
ng.  Basic metabolic profile is otherwise unremarkable.  Magnesium and phosphorus are normal.  BNP wa
s elevated this afternoon.  Respiratory virus panel from yesterday was positive for RSVB.

 

ASSESSMENT:

1.  Acute hypoxic respiratory failure secondary to atelectasis of the right lower lobe, resolved.

2.  Central sleep apnea.

3.  Delirium, sedated.

4.  Colon distention.

5.  Upper respiratory tract infection secondary to respiratory syncytial virus B.

 

PLAN:  The patient has made a remarkable recovery over the past 24 hours.  I think that most of what 
we are seeing is a delirium with waxing and waning unconsciousness.  At this point, I do not think lucas boyle has further requirements for inpatient Pulmonary or Critical Care opinion.  As such, I will sign of
f.  Please call with additional questions or concerns moving forward.

## 2018-01-02 NOTE — PRG
DATE OF SERVICE:  01/02/2018

 

SUBJECTIVE:  Ms. Farnsworth is postoperative day #3 status post left hip hemiarthroplasty.  She was trans
ferred to the Intermediate Care Unit overnight due to some complaint of shortness of breath.  She was
 treated with one dose of Lasix and has done well since.  She did not have any arrhythmias overnight.
  She denies any chest pain, syncope or dyspnea at the time of this evaluation.

 

PHYSICAL EXAMINATION:

VITAL SIGNS:  Today includes blood pressure 145/63, pulse is 90, respiratory rate is 20, maximum temp
erature in the last 24 hours is 100 degrees Fahrenheit, oxygen saturation is 98% on 2 liters by nasal
 cannula oxygen.

HEENT:  Examination reveals normocephalic and atraumatic.  Pupils are equal, round, and reactive to l
ight and accommodation.  Extraocular muscles are intact bilaterally.  She has no sclerae icterus pres
ent.

NECK:  No jugular venous distention is noted.

HEART:  Reveals regular rate and rhythm, no murmurs or gallops auscultated.

CHEST:  Clear to auscultation bilaterally.  Breathing is regular and unlabored.

ABDOMEN:  Soft, nontender and nondistended.

EXTREMITIES:  There are 2+ radial and pedal pulses bilaterally.  No ankle edema is present.

NEUROLOGIC:  Examination reveals no focal deficits present.

 

LABORATORY DATA:  Urinary output has been adequate.  Metabolic profile today includes sodium 138, pot
assium is 3.5, chloride is 105, bicarbonate 26, BUN 22, creatinine is 1.45, glucose is 107, magnesium
 1.9, and phosphorus is 2.4.  CBC with 4900 white blood cells, hemoglobin and hematocrit are stable a
t 8.9 and 27.2 respectively, platelet count is 132,000.

 

IMPRESSION:

1.  Postoperative day #2, status post left hemiarthroplasty.

2.  Stable acute blood loss anemia.

3.  Acute hypokalemia.

4.  Acute hypomagnesemia.

5.  Acute hypophosphatemia.

 

PLAN:

1.  Correct abnormal electrolytes.

2.  Continue with iron replacement therapy as well as vitamin C.

3.  Increase activity per physical and occupational therapy in anticipation for inpatient rehabilitat
ion.

4.  Above findings and plan discussed with the patient who indicates understanding of the information
 provided.

5.  If stable, patient will either be transferred to rehab tomorrow or to general surgical floor.

## 2018-01-02 NOTE — PRG
DATE OF SERVICE:  01/02/2018

 

SUBJECTIVE:  This is an 82-year-old female postop day #3 status post left hip hemiarthroplasty.  The 
patient was transferred to [facility] yesterday for shortness of breath and diuresed.  She is currently on 
room air.  She has been intermittently refusing medications.  She states she feels she is too weak; h
owever, she has been taking her pain medications.  The patient has no other complaints at this time.

 

OBJECTIVE:

VITAL SIGNS:  Reviewed and stable.  T-max 100 degrees Fahrenheit.  The patient is currently 95% on ro
om air.

GENERAL:  Resting in bed in no acute distress.

RESPIRATORY:  Breathing appears nonlabored.

 

ASSESSMENT:  As documented in daily progress note.

 

PLAN:  Continue care as ordered.  Recheck electrolytes in a.m.  Continue to monitor.  The patient enc
ouraged to take medications p.o. as ordered.

## 2018-01-03 LAB
ANION GAP SERPL CALC-SCNC: 12 MMOL/L (ref 10–20)
BUN SERPL-MCNC: 25 MG/DL (ref 9.8–20.1)
CALCIUM SERPL-MCNC: 8.8 MG/DL (ref 7.8–10.44)
CHLORIDE SERPL-SCNC: 102 MMOL/L (ref 98–107)
CO2 SERPL-SCNC: 26 MMOL/L (ref 23–31)
CREAT CL PREDICTED SERPL C-G-VRATE: 29 ML/MIN (ref 70–130)
GLUCOSE SERPL-MCNC: 124 MG/DL (ref 83–110)
MAGNESIUM SERPL-MCNC: 2.8 MG/DL (ref 1.6–2.6)
POTASSIUM SERPL-SCNC: 3.2 MMOL/L (ref 3.5–5.1)
SODIUM SERPL-SCNC: 137 MMOL/L (ref 136–145)

## 2018-01-03 RX ADMIN — Medication SCH MG: at 21:14

## 2018-01-03 RX ADMIN — DOCUSATE SODIUM 50 MG AND SENNOSIDES 8.6 MG SCH TAB: 8.6; 5 TABLET, FILM COATED ORAL at 21:14

## 2018-01-03 RX ADMIN — DOCUSATE SODIUM 50 MG AND SENNOSIDES 8.6 MG SCH TAB: 8.6; 5 TABLET, FILM COATED ORAL at 08:19

## 2018-01-03 RX ADMIN — Medication SCH MG: at 08:19

## 2018-01-03 RX ADMIN — MULTIPLE VITAMINS W/ MINERALS TAB SCH TAB: TAB at 08:19

## 2018-01-03 NOTE — PRG
DATE OF SERVICE:  01/03/2018

 

SUBJECTIVE:  Ammy Farnsworth is an 82-year-old female postop day #5, left hip hemiarthroplasty.  The patricia
ent transferred to floor from [facility] earlier today.  She vocalized no complaints this evening.

 

OBJECTIVE:

VITAL SIGNS:  Reviewed.  The patient is afebrile.  Blood pressure is stable.

GENERAL:  Resting in bed, in no acute distress.  Breathing is nonlabored.

 

ASSESSMENT:  As documented in daily progress note.

 

PLAN:  Continue care as ordered.  Continue to monitor.

## 2018-01-03 NOTE — PRG
DATE OF SERVICE:  01/03/2018

 

SUBJECTIVE:  The patient is postop day #4 from a left hip hemiarthroplasty.  She is currently still o
n the [facility].  Yesterday, she was diuresed again and overnight has had no issues.  This morning, the pa
aminta states that she feels a little "tired" and her granddaughter notes that she did not eat much of
 her breakfast.  Otherwise, the patient states that her pain is controlled.  She denies shortness of 
breath, chest pain or nausea.

 

OBJECTIVE:

VITAL SIGNS:  Temperature is 98.6, heart rate 66, blood pressure 133/48, respirations 20 and oxygen s
aturation is 92% on room air.

GENERAL:  The patient is sitting comfortably in bed.  She appears drowsy, but will open her eyes to v
erbal stimuli and will answer simple questions and follow commands.

HEENT:  Unremarkable.  Trachea is midline.  No JVD.

CHEST:  Clear to auscultation bilaterally.

HEART:  Regular rate and rhythm.

ABDOMEN:  Soft, flat and nontender with active bowel sounds.

EXTREMITIES:  Neurovascularly intact x4.  Lower extremities showed less pitting edema compared to yes
terday to a degree.

SKIN:  Actually shows wrinkles now.

 

LABORATORY FINDINGS:  This morning, sodium 137, potassium 3.2, chloride 102, CO2 of 26, BUN 25, creat
inine 1.50, glucose 124, magnesium 2.8, and phosphorus 5.5.  There are no radiographs to review this 
morning.

 

ASSESSMENT AND PLAN:

1.  Status post ground level fall.

2.  Status post left hip hemiarthroplasty.

 

Plan will be to continue physical and occupational therapy and encourage p.o. intake.  We will repeat
 her labs in the morning and await placement decision.

 

This case was discussed with Dr. Nelson during rounds this morning.

## 2018-01-03 NOTE — PRG
DATE OF SERVICE:  01/03/2018

 

SERVICE:  Pulmonary Medicine.

 

INTERVAL HISTORY:  The patient is doing okay from a respiratory standpoint.  She remains a little bit
 confused.  Outside of that, there has been no interval change to her condition.  She has no complain
ts this morning other than general discomfort.  She really cannot help me understand that.  She does 
not know why she is in the hospital and what the situation is.

 

PHYSICAL EXAMINATION:

VITAL SIGNS:  Afebrile, pulse 72, blood pressure 138/54, respirations 20, saturation 95% on room air.


GENERAL:  Patient is awake, alert, in no apparent distress.

LUNGS:  Decent air entry.  There are no prolonged expiratory phase, wheezing, rhonchi or crackles.

HEART:  Normal rate, regular.

ABDOMEN:  Soft, nontender, nondistended.  Bowel sounds positive.

MUSCULOSKELETAL:  No cyanosis or clubbing.  There is trace pitting at the site of her surgery.  Other
 side does not have pitting any longer.

NEUROLOGIC:  Grossly nonfocal.

 

LABORATORY DATA:  Creatinine 1.50.  Potassium 3.2.  Basic metabolic profile is otherwise unremarkable
.  Respiratory virus panel was positive for respiratory syncytial virus B.

 

ASSESSMENT:

1.  Acute hypoxic respiratory failure secondary to atelectasis of the right lower lobe, resolved.

2.  Central sleep apnea.

3.  Delirium with interposed sedation and agitation.

4.  Upper respiratory tract infection secondary to respiratory syncytial virus B.

 

PLAN:  At this point, the patient has no further requirements for inpatient Pulmonary or inpatient Cr
itical Care opinion.  I will keep following as long as she remains in this location, but when she erik
ves, I will be signing off.  Potassium will be replaced today.  Please call with additional questions
 or concerns.

## 2018-01-04 LAB
ANION GAP SERPL CALC-SCNC: 14 MMOL/L (ref 10–20)
BUN SERPL-MCNC: 24 MG/DL (ref 9.8–20.1)
CALCIUM SERPL-MCNC: 9.3 MG/DL (ref 7.8–10.44)
CHLORIDE SERPL-SCNC: 103 MMOL/L (ref 98–107)
CO2 SERPL-SCNC: 27 MMOL/L (ref 23–31)
CREAT CL PREDICTED SERPL C-G-VRATE: 32 ML/MIN (ref 70–130)
GLUCOSE SERPL-MCNC: 120 MG/DL (ref 83–110)
MAGNESIUM SERPL-MCNC: 2.6 MG/DL (ref 1.6–2.6)
POTASSIUM SERPL-SCNC: 3.6 MMOL/L (ref 3.5–5.1)
SODIUM SERPL-SCNC: 140 MMOL/L (ref 136–145)

## 2018-01-04 RX ADMIN — DOCUSATE SODIUM 50 MG AND SENNOSIDES 8.6 MG SCH: 8.6; 5 TABLET, FILM COATED ORAL at 21:52

## 2018-01-04 RX ADMIN — Medication SCH: at 21:52

## 2018-01-04 RX ADMIN — MULTIPLE VITAMINS W/ MINERALS TAB SCH TAB: TAB at 09:04

## 2018-01-04 RX ADMIN — DOCUSATE SODIUM 50 MG AND SENNOSIDES 8.6 MG SCH TAB: 8.6; 5 TABLET, FILM COATED ORAL at 09:04

## 2018-01-04 RX ADMIN — Medication SCH MG: at 09:04

## 2018-01-04 NOTE — PRG
DATE OF SERVICE:  01/04/2018

 

SUBJECTIVE:  Ammy Farnsworth is an 82-year-old female, postop day #6 hip fracture repair.  The patient vo
calized no complaints this evening.  Pain is controlled per family at bedside.  She is currently awai
ting acceptance for skilled nursing facility.

 

OBJECTIVE:

VITAL SIGNS:  Reviewed and stable.

GENERAL:  The patient is resting in bed in no acute distress.

RESPIRATORY:  Breathing is nonlabored.

 

ASSESSMENT AND PLAN:  As documented in daily progress note.  Continue care as ordered.  Continue to m
onitor.

## 2018-01-04 NOTE — PRG
DATE OF SERVICE:  01/04/2018

 

SUBJECTIVE:  The patient is postop day #5 from a left hip hemiarthroplasty.  She was moved yesterday 
from the [facility] appear to the surgical floor.  The patient is under Droplet precautions for RSV positiv
e nasal swab.  Otherwise, the patient is doing well.  Her pain is controlled.  She is tolerating a di
et.

 

OBJECTIVE:

VITAL SIGNS:  Temperature 98.8, heart rate 82, blood pressure 162/65, respirations 18, oxygen saturat
ion 95% on room air.

GENERAL:  The patient is resting comfortably in bed.  She is awake and will answer simple questions a
nd follow simple commands.

HEENT:  Unremarkable.

HEART:  Regular rate and rhythm.

LUNGS:  Chest is clear to auscultation bilaterally.

ABDOMEN:  Soft, flat, nontender with active bowel sounds.

EXTREMITIES:  Grossly intact x4.

 

LABORATORY DATA:  This morning, sodium 140, potassium 3.6, chloride 103, CO2 of 27, BUN 24, creatinin
e 1.37, glucose 120, magnesium 2.6, phosphorus 3.4.  There are no radiographs reviewed this morning.

 

ASSESSMENT AND PLAN:

1.  Status post ground level fall.

2.  Status post left hip hemiarthroplasty.

 

PLAN:  Will be to continue supportive care, physical and occupational therapy and await placement.  W
e were informed by the case workers that the family has decided on a skilled nursing facility and we 
are waiting approval for that.  The patient was evaluated with Dr. Nelson this morning during rounds.

## 2018-01-05 LAB
ANION GAP SERPL CALC-SCNC: 14 MMOL/L (ref 10–20)
BUN SERPL-MCNC: 23 MG/DL (ref 9.8–20.1)
CALCIUM SERPL-MCNC: 9 MG/DL (ref 7.8–10.44)
CHLORIDE SERPL-SCNC: 104 MMOL/L (ref 98–107)
CO2 SERPL-SCNC: 25 MMOL/L (ref 23–31)
CREAT CL PREDICTED SERPL C-G-VRATE: 33 ML/MIN (ref 70–130)
GLUCOSE SERPL-MCNC: 99 MG/DL (ref 83–110)
MAGNESIUM SERPL-MCNC: 2.4 MG/DL (ref 1.6–2.6)
POTASSIUM SERPL-SCNC: 3.8 MMOL/L (ref 3.5–5.1)
SODIUM SERPL-SCNC: 139 MMOL/L (ref 136–145)

## 2018-01-05 RX ADMIN — Medication SCH MG: at 10:02

## 2018-01-05 RX ADMIN — DOCUSATE SODIUM 50 MG AND SENNOSIDES 8.6 MG SCH TAB: 8.6; 5 TABLET, FILM COATED ORAL at 10:01

## 2018-01-05 RX ADMIN — DOCUSATE SODIUM 50 MG AND SENNOSIDES 8.6 MG SCH TAB: 8.6; 5 TABLET, FILM COATED ORAL at 20:51

## 2018-01-05 RX ADMIN — MULTIPLE VITAMINS W/ MINERALS TAB SCH TAB: TAB at 10:01

## 2018-01-05 RX ADMIN — Medication SCH MG: at 20:50

## 2018-01-05 NOTE — PRG
DATE OF SERVICE:  01/05/2018

 

SUBJECTIVE:  Ammy Farnsworth is an 82-year-old female, postop day #7, hip fracture repair.  She vocalized
 no complaint this evening.  The patient is currently waiting acceptance for skilled nursing.  Family
 is at bedside.

 

OBJECTIVE:

VITAL SIGNS:  Reviewed and stable.

GENERAL:  The patient is resting in bed, in no acute distress.

LUNGS:  Breathing is nonlabored.

 

ASSESSMENT AND PLAN:  As documented in daily progress note.  Continue care as ordered.  Continue to m
onitor.  Await acceptance to skilled nursing.

## 2018-01-06 VITALS — DIASTOLIC BLOOD PRESSURE: 76 MMHG | SYSTOLIC BLOOD PRESSURE: 183 MMHG

## 2018-01-06 VITALS — TEMPERATURE: 97.8 F

## 2018-01-06 NOTE — DIS
DATE OF ADMISSION:  12/30/2017

 

DATE OF DISCHARGE:  01/06/2018

 

ADMISSION DIAGNOSES:

1.  Status post ground level fall.

2.  Left displaced femoral neck fracture.

 

CONSULTATIONS:  Orthopedic, Dr. Goodman.

 

PROCEDURE PERFORMED:  Press-fit left hip monopolar hemiarthroplasty.

 

SUMMARY:  The patient is an 82-year-old woman, who reportedly fell out of bed the morning of [facility].  The patient was brought to the emergency department, evaluated, examined and noted to have the ab
ove injuries.  She was stable and able to go to the operating room the same day and she tolerated thi
s procedure well.  Approximately 36 hours later, the patient started having some short of respiratory
 issues on the floor and she was transferred down to the IM for 2 days.  Evaluation by Dr. Looney 
felt this was most likely a component of sleep apnea.  The patient had no issues in the IMCU and was 
eventually transferred back to surgical floor, where she continued to work with physical and occupati
onal therapy.  At the time of discharge, the patient was working with physical and occupational thera
py.  She was tolerating her diet.  Her bowel function returned and her pain was controlled.  She will
 be discharged to a skilled nursing facility, was supposed to be this afternoon, but it is now change
d to tomorrow morning.  The patient will follow up with Dr. Goodman in 2 to 3 weeks or sooner as audrey jain

## 2018-01-11 NOTE — EKG
Test Reason : 

Blood Pressure : ***/*** mmHG

Vent. Rate : 066 BPM     Atrial Rate : 066 BPM

   P-R Int : 150 ms          QRS Dur : 096 ms

    QT Int : 146 ms       P-R-T Axes : 055 -04 000 degrees

   QTc Int : 153 ms

 

Sinus rhythm with Premature supraventricular complexes

Nonspecific T wave abnormality

Abnormal ECG

When compared with ECG of 19-MAR-1995 06:48,

Premature supraventricular complexes are now Present

Nonspecific T wave abnormality now evident in Inferior leads

QT has shortened

Confirmed by ODALIS PERALTA ([id number]) on 1/11/2018 7:24:10 PM

 

Referred By:  PHONG           Confirmed By:ODALIS PERALTA

## 2018-02-25 ENCOUNTER — HOSPITAL ENCOUNTER (EMERGENCY)
Dept: HOSPITAL 9 - MADERS | Age: 83
Discharge: HOME | End: 2018-02-25
Payer: MEDICARE

## 2018-02-25 DIAGNOSIS — I12.9: ICD-10-CM

## 2018-02-25 DIAGNOSIS — E11.22: ICD-10-CM

## 2018-02-25 DIAGNOSIS — Z79.899: ICD-10-CM

## 2018-02-25 DIAGNOSIS — Z79.84: ICD-10-CM

## 2018-02-25 DIAGNOSIS — S86.911A: Primary | ICD-10-CM

## 2018-02-25 DIAGNOSIS — N18.9: ICD-10-CM

## 2018-02-25 DIAGNOSIS — F03.90: ICD-10-CM

## 2018-02-25 DIAGNOSIS — M25.461: ICD-10-CM

## 2018-02-25 LAB
ALBUMIN SERPL BCG-MCNC: 4.1 G/DL (ref 3.4–4.8)
ALP SERPL-CCNC: 148 U/L (ref 40–150)
ALT SERPL W P-5'-P-CCNC: 9 U/L (ref 8–55)
ANION GAP SERPL CALC-SCNC: 17 MMOL/L (ref 10–20)
AST SERPL-CCNC: 14 U/L (ref 5–34)
BACTERIA UR QL AUTO: (no result) HPF
BASOPHILS # BLD AUTO: 0 THOU/UL (ref 0–0.2)
BASOPHILS NFR BLD AUTO: 0.4 % (ref 0–1)
BILIRUB SERPL-MCNC: 0.6 MG/DL (ref 0.2–1.2)
BUN SERPL-MCNC: 29 MG/DL (ref 9.8–20.1)
CALCIUM SERPL-MCNC: 10.6 MG/DL (ref 7.8–10.44)
CHLORIDE SERPL-SCNC: 106 MMOL/L (ref 98–107)
CO2 SERPL-SCNC: 22 MMOL/L (ref 23–31)
CREAT CL PREDICTED SERPL C-G-VRATE: 0 ML/MIN (ref 70–130)
EOSINOPHIL # BLD AUTO: 0 THOU/UL (ref 0–0.7)
EOSINOPHIL NFR BLD AUTO: 0.2 % (ref 0–10)
GLOBULIN SER CALC-MCNC: 3.2 G/DL (ref 2.4–3.5)
GLUCOSE SERPL-MCNC: 129 MG/DL (ref 83–110)
HGB BLD-MCNC: 12 G/DL (ref 12–16)
LYMPHOCYTES # BLD AUTO: 0.7 THOU/UL (ref 1.2–3.4)
LYMPHOCYTES NFR BLD AUTO: 6.8 % (ref 21–51)
MCH RBC QN AUTO: 28.2 PG (ref 27–31)
MCV RBC AUTO: 88.1 FL (ref 81–99)
MONOCYTES # BLD AUTO: 0.5 THOU/UL (ref 0.11–0.59)
MONOCYTES NFR BLD AUTO: 4.7 % (ref 0–10)
NEUTROPHILS # BLD AUTO: 8.5 THOU/UL (ref 1.4–6.5)
NEUTROPHILS NFR BLD AUTO: 88 % (ref 42–75)
PLATELET # BLD AUTO: 236 THOU/UL (ref 130–400)
POTASSIUM SERPL-SCNC: 4.6 MMOL/L (ref 3.5–5.1)
PROT UR STRIP.AUTO-MCNC: 100 MG/DL
RBC # BLD AUTO: 4.26 MILL/UL (ref 4.2–5.4)
RBC UR QL AUTO: (no result) HPF (ref 0–3)
SODIUM SERPL-SCNC: 140 MMOL/L (ref 136–145)
SP GR UR STRIP: 1.02 (ref 1–1.04)
URATE SERPL-MCNC: 5.9 MG/DL (ref 2.6–6)
WBC # BLD AUTO: 9.7 THOU/UL (ref 4.8–10.8)
WBC UR QL AUTO: (no result) HPF (ref 0–3)

## 2018-02-25 PROCEDURE — 84550 ASSAY OF BLOOD/URIC ACID: CPT

## 2018-02-25 PROCEDURE — A4353 INTERMITTENT URINARY CATH: HCPCS

## 2018-02-25 PROCEDURE — 96375 TX/PRO/DX INJ NEW DRUG ADDON: CPT

## 2018-02-25 PROCEDURE — 96361 HYDRATE IV INFUSION ADD-ON: CPT

## 2018-02-25 PROCEDURE — 36415 COLL VENOUS BLD VENIPUNCTURE: CPT

## 2018-02-25 PROCEDURE — 85652 RBC SED RATE AUTOMATED: CPT

## 2018-02-25 PROCEDURE — 87086 URINE CULTURE/COLONY COUNT: CPT

## 2018-02-25 PROCEDURE — 85025 COMPLETE CBC W/AUTO DIFF WBC: CPT

## 2018-02-25 PROCEDURE — 81001 URINALYSIS AUTO W/SCOPE: CPT

## 2018-02-25 PROCEDURE — 96374 THER/PROPH/DIAG INJ IV PUSH: CPT

## 2018-02-25 PROCEDURE — 80053 COMPREHEN METABOLIC PANEL: CPT

## 2018-02-25 NOTE — RAD
LEFT HIP 2 VIEWS:

 

Date:  02/25/18 

 

HISTORY:  

Joint pain. 

 

COMPARISON:  

12/30/17. 

 

FINDINGS:

Left hip prosthesis is again noted. Components appear in adequate position and alignment. No evidence
 of loosening. No fracture or acute osseous lesion. No interval change. 

 

IMPRESSION: 

No acute findings.  

 

 

POS: [facility]

## 2018-02-25 NOTE — RAD
LEFT KNEE 4 VIEWS:

 

Date:  02/25/18 

 

HISTORY:  

Joint pain. 

 

FINDINGS:

Medial and lateral joint spaces are maintained. Chondrocalcinosis is seen in both medial and lateral 
joints. Mild degenerative change is seen; however, only mild spurring is seen from the tibial spines,
 condyles, and patella. There is evidence of a joint effusion in the suprapatellar region. 

 

IMPRESSION: 

Mild degenerative changes consistent with chondrocalcinosis and mild degenerative osteophytes. Joint 
effusion is noted in a suprapatellar region. No acute fracture. 

 

 

POS: [facility]